# Patient Record
Sex: FEMALE | Race: AMERICAN INDIAN OR ALASKA NATIVE | HISPANIC OR LATINO
[De-identification: names, ages, dates, MRNs, and addresses within clinical notes are randomized per-mention and may not be internally consistent; named-entity substitution may affect disease eponyms.]

---

## 2024-06-04 PROBLEM — 15167005: Status: ACTIVE | Noted: 2024-06-04

## 2024-06-05 ENCOUNTER — DASHBOARD ENCOUNTERS (OUTPATIENT)
Age: 34
End: 2024-06-05

## 2024-06-05 ENCOUNTER — OFFICE VISIT (OUTPATIENT)
Dept: URBAN - METROPOLITAN AREA CLINIC 72 | Facility: CLINIC | Age: 34
End: 2024-06-05
Payer: COMMERCIAL

## 2024-06-05 VITALS
TEMPERATURE: 97.5 F | SYSTOLIC BLOOD PRESSURE: 131 MMHG | HEART RATE: 68 BPM | HEIGHT: 62 IN | BODY MASS INDEX: 40.63 KG/M2 | DIASTOLIC BLOOD PRESSURE: 87 MMHG | WEIGHT: 220.8 LBS

## 2024-06-05 DIAGNOSIS — R74.01 ELEVATED AST (SGOT): ICD-10-CM

## 2024-06-05 DIAGNOSIS — K59.09 CHRONIC CONSTIPATION: ICD-10-CM

## 2024-06-05 DIAGNOSIS — F10.10 ETOH ABUSE: ICD-10-CM

## 2024-06-05 DIAGNOSIS — R17 TOTAL BILIRUBIN, ELEVATED: ICD-10-CM

## 2024-06-05 DIAGNOSIS — R10.11 RUQ ABDOMINAL PAIN: ICD-10-CM

## 2024-06-05 PROBLEM — 176271000119108: Status: ACTIVE | Noted: 2024-06-05

## 2024-06-05 PROBLEM — 301717006: Status: ACTIVE | Noted: 2024-06-05

## 2024-06-05 PROBLEM — 236069009: Status: ACTIVE | Noted: 2024-06-05

## 2024-06-05 PROBLEM — 166669000: Status: ACTIVE | Noted: 2024-06-05

## 2024-06-05 PROCEDURE — 99204 OFFICE O/P NEW MOD 45 MIN: CPT | Performed by: INTERNAL MEDICINE

## 2024-06-05 RX ORDER — LOSARTAN POTASSIUM 25 MG/1
TABLET, FILM COATED ORAL
Qty: 30 TABLET | Status: ACTIVE | COMMUNITY

## 2024-06-05 RX ORDER — DICYCLOMINE HYDROCHLORIDE 20 MG/1
TABLET ORAL
Qty: 20 TABLET | Status: ACTIVE | COMMUNITY

## 2024-06-05 NOTE — HPI-TODAY'S VISIT:
Patient is a 33 yo female seen in the [facility] ER on 5/31/24 for RUQ abd pain with flank pain and hematuria. She is a heavy drinker. No referral.   Patient is still having flank pain that radiates to her back. She gets bloated. She is tired - has no energy. Patient was given dicyclomine from the ER but that has not helped. BM's are very irregular. They are solid - it takes her a while to finish. Sometimes she has urgency at times with soft bowels.   5/31/24 CT abd/pelvis w/o contrast - no stomach or colon findings.   5/31/24 Labs: CBC WNL; CMP WNL other than  TBili 1.3; ALT 13; AST 44;

## 2024-06-05 NOTE — EXAM-FUNCTIONAL ASSESSMENT
General--no acute distress, normal appearance Eyes--anicteric, no pallor HENT--normocephalic, atraumatic head Neck--no lymphadenopathy, symmetric Chest--non labored, equal chest rise Heart--regular rate Abdomen--soft, non tender, non distended, no organomegaly Skin--no rashes, no jaundice Neurologic--Alert and oriented x 3, answers questions appropriately Psychiatric--stable mood, appropriate affect

## 2024-07-02 ENCOUNTER — OFFICE VISIT (OUTPATIENT)
Dept: URBAN - METROPOLITAN AREA CLINIC 72 | Facility: CLINIC | Age: 34
End: 2024-07-02

## 2024-07-02 RX ORDER — LOSARTAN POTASSIUM 25 MG/1
TABLET, FILM COATED ORAL
Qty: 30 TABLET | Status: ACTIVE | COMMUNITY

## 2024-07-02 RX ORDER — DICYCLOMINE HYDROCHLORIDE 20 MG/1
TABLET ORAL
Qty: 20 TABLET | Status: ACTIVE | COMMUNITY

## 2024-07-02 NOTE — HPI-OTHER HISTORIES
5/31/24 CT abd/pelvis w/o contrast - no stomach or colon findings.   5/31/24 Labs: CBC WNL; CMP WNL other than TBili 1.3; ALT 13; AST 44;

## 2024-07-02 NOTE — HPI-TODAY'S VISIT:
Patient is a 35 yo female seen 4 weeks ago for RUQ pain and was seen in [facility] ER. Workup negative. Thought her pain could be bowel related so the patient was asked to start miralax/fiber daily. This is to re-evaluate the progress.  Patient is a drinker. LFTs have been elevated in the past. PCP doing labs in August.   Could consider MRI for liver if RUQ hasnt gotten better. Patient had to have a dutch in the past for GB stones.